# Patient Record
Sex: FEMALE | Race: BLACK OR AFRICAN AMERICAN | Employment: UNEMPLOYED | RURAL
[De-identification: names, ages, dates, MRNs, and addresses within clinical notes are randomized per-mention and may not be internally consistent; named-entity substitution may affect disease eponyms.]

---

## 2017-09-18 ENCOUNTER — OFFICE VISIT (OUTPATIENT)
Dept: FAMILY MEDICINE CLINIC | Age: 52
End: 2017-09-18

## 2017-09-18 VITALS
DIASTOLIC BLOOD PRESSURE: 78 MMHG | HEIGHT: 65 IN | WEIGHT: 165.8 LBS | HEART RATE: 71 BPM | TEMPERATURE: 99 F | OXYGEN SATURATION: 100 % | SYSTOLIC BLOOD PRESSURE: 140 MMHG | RESPIRATION RATE: 16 BRPM | BODY MASS INDEX: 27.63 KG/M2

## 2017-09-18 DIAGNOSIS — Z13.31 SCREENING FOR DEPRESSION: ICD-10-CM
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
## 2017-09-18 DIAGNOSIS — M54.2 NECK PAIN: ICD-10-CM
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
## 2017-09-19 ENCOUNTER — OFFICE VISIT (OUTPATIENT)
Dept: FAMILY MEDICINE CLINIC | Age: 52
End: 2017-09-19

## 2017-09-19 VITALS
HEIGHT: 65 IN | SYSTOLIC BLOOD PRESSURE: 122 MMHG | DIASTOLIC BLOOD PRESSURE: 60 MMHG | BODY MASS INDEX: 27.69 KG/M2 | TEMPERATURE: 98.8 F | WEIGHT: 166.2 LBS | HEART RATE: 88 BPM | OXYGEN SATURATION: 100 % | RESPIRATION RATE: 16 BRPM

## 2017-09-19 DIAGNOSIS — H61.23 HEARING LOSS DUE TO CERUMEN IMPACTION, BILATERAL: ICD-10-CM

## 2017-09-19 DIAGNOSIS — J01.00 ACUTE MAXILLARY SINUSITIS, RECURRENCE NOT SPECIFIED: Primary | ICD-10-CM

## 2017-09-19 RX ORDER — AZITHROMYCIN 250 MG/1
TABLET, FILM COATED ORAL
Qty: 6 TAB | Refills: 0 | Status: SHIPPED | OUTPATIENT
Start: 2017-09-19 | End: 2017-09-24

## 2017-09-19 NOTE — PROGRESS NOTES
HISTORY OF PRESENT ILLNESS  Denise Rendon is a 50 y.o. female. Chief Complaint   Patient presents with    Nasal Congestion       HPI  For a while now nasal congestion and coughing up phlegm for 3 w With green mucus from postnasal drip  Getting worse now  Temp 99 yesterday  Facial pain, with thick yellow mucus    No Strep contact    Would like wax removed from ears      Review of Systems   Constitutional: Positive for fever. HENT: Positive for congestion and sore throat (little). Negative for ear pain. Respiratory: Positive for cough and sputum production. Negative for shortness of breath and wheezing. Gastrointestinal: Positive for nausea. Negative for vomiting. Past Medical History:   Diagnosis Date    Allergic rhinitis     Bronchiectasis (HCC)     Bulging lumbar disc     Cavitating mass of lung 2009    L- benign    Cervical spondylarthritis     Hyperlipidemia     Hypomagnesemia     Hypothyroid     Lung mass     Panic      Current Outpatient Prescriptions   Medication Sig Dispense Refill    azithromycin (ZITHROMAX) 250 mg tablet Take 2 tablets today, then take 1 tablet daily 6 Tab 0    diphenhydrAMINE (BENADRYL) 25 mg capsule Take 25 mg by mouth every six (6) hours as needed.  acetaminophen (TYLENOL) 325 mg tablet Take  by mouth every four (4) hours as needed for Pain. Allergies   Allergen Reactions    Bactrim [Sulfamethoprim] Angioedema    Morphine Unknown (comments)    Pcn [Penicillins] Unknown (comments)    Percocet [Oxycodone-Acetaminophen] Unknown (comments)    Sulfa (Sulfonamide Antibiotics) Unknown (comments)     Visit Vitals    /60 (BP 1 Location: Right arm, BP Patient Position: Sitting)    Pulse 88    Temp 98.8 °F (37.1 °C) (Oral)    Resp 16    Ht 5' 5\" (1.651 m)    Wt 166 lb 3.2 oz (75.4 kg)    LMP 09/11/2017    SpO2 100%    BMI 27.66 kg/m2     Physical Exam   Constitutional: She is oriented to person, place, and time.  She appears well-developed and well-nourished. No distress. HENT:   Head: Normocephalic and atraumatic. Mouth/Throat: Oropharynx is clear and moist. No oropharyngeal exudate. Cerumen impaction bilat, white mucus in nose, facial tenderness over max sinuses   Eyes: Conjunctivae and EOM are normal. Pupils are equal, round, and reactive to light. Cardiovascular: Normal rate, regular rhythm and normal heart sounds. Pulmonary/Chest: Effort normal and breath sounds normal.   Lymphadenopathy:     She has no cervical adenopathy. Neurological: She is alert and oriented to person, place, and time. Skin: Skin is warm and dry. Psychiatric: She has a normal mood and affect. Nursing note and vitals reviewed. Cerumen removal bilat with curette and lavage successful, TM's intact    ASSESSMENT and PLAN    ICD-10-CM ICD-9-CM    1. Acute maxillary sinusitis, recurrence not specified J01.00 461.0 azithromycin (ZITHROMAX) 250 mg tablet   2.  Hearing loss due to cerumen impaction, bilateral H61.23 389.8 REMOVE IMPACTED EAR WAX     380.4

## 2017-09-19 NOTE — MR AVS SNAPSHOT
Visit Information Date & Time Provider Department Dept. Phone Encounter #  
 9/19/2017  8:20 AM Yudelka Senior  [facility] [phone] [id number] Follow-up Instructions Return if symptoms worsen or fail to improve. Follow-up and Disposition History Your Appointments 10/16/2017  9:20 AM  
ESTABLISHED PATIENT with Yudelka Senior MD  
[address] [facility] ([address]) Appt Note: 4 week f.up self pay 9/18/17 [facility] [phone] [phone]  
[phone]  
  
   
 [address] [address] Upcoming Health Maintenance Date Due DTaP/Tdap/Td series (1 - Tdap) 9/3/1988 PAP AKA CERVICAL CYTOLOGY 9/3/1988 INFLUENZA AGE 9 TO ADULT 8/1/2017 FOBT Q 1 YEAR AGE 50-75 9/3/2017 BREAST CANCER SCRN MAMMOGRAM 8/14/2019 Allergies as of 9/19/2017  Review Complete On: 9/19/2017 By: Sakina Harris LPN Severity Noted Reaction Type Reactions Bactrim [Sulfamethoprim]  12/11/2013    Angioedema Morphine  01/21/2014    Unknown (comments) Pcn [Penicillins]  08/20/2017    Unknown (comments) Percocet [Oxycodone-acetaminophen]  12/12/2013    Unknown (comments) Sulfa (Sulfonamide Antibiotics)  08/20/2017    Unknown (comments) Current Immunizations  Never Reviewed Name Date  
 TB Skin Test (PPD) 10/2/2009 Not reviewed this visit You Were Diagnosed With   
  
 Codes Comments Acute maxillary sinusitis, recurrence not specified    -  Primary ICD-10-CM: J01.00 ICD-9-CM: 461.0 Hearing loss due to cerumen impaction, bilateral     ICD-10-CM: H61.23 
ICD-9-CM: 389.8, 380.4 Vitals BP Pulse Temp Resp Height(growth percentile) Weight(growth percentile) 122/60 (BP 1 Location: Right arm, BP Patient Position: Sitting) 88 98.8 °F (37.1 °C) (Oral) 16 5' 5\" (1.651 m) 166 lb 3.2 oz (75.4 kg) LMP SpO2 BMI OB Status Smoking Status 09/11/2017 100% 27.66 kg/m2 Having regular periods Never Smoker BMI and BSA Data Body Mass Index Body Surface Area  
 27.66 kg/m 2 1.86 m 2 Preferred Pharmacy Pharmacy Name Phone [facility] [address] [place] [address], [address] [place] [address] Your Updated Medication List  
  
   
This list is accurate as of: 9/19/17  9:01 AM.  Always use your most recent med list.  
  
  
  
  
 azithromycin 250 mg tablet Commonly known as:  Marina Lowry Take 2 tablets today, then take 1 tablet daily BENADRYL 25 mg capsule Generic drug:  diphenhydrAMINE Take 25 mg by mouth every six (6) hours as needed. TYLENOL 325 mg tablet Generic drug:  acetaminophen Take  by mouth every four (4) hours as needed for Pain. Prescriptions Sent to Pharmacy Refills  
 azithromycin (ZITHROMAX) 250 mg tablet 0 Sig: Take 2 tablets today, then take 1 tablet daily Class: Normal  
 Pharmacy: [facility] [address] [place] [address] 3 & [facility] #: [phone] We Performed the Following REMOVE IMPACTED EAR WAX [61534 CPT(R)] Follow-up Instructions Return if symptoms worsen or fail to improve. Introducing [facility] & HEALTH SERVICES! Dear Alka Reach: 
Thank you for requesting a [employer] account. Our records indicate that you already have an active [employer] account. You can access your account anytime at [email or web address] Did you know that you can access your hospital and ER discharge instructions at any time in [employer]? You can also review all of your test results from your hospital stay or ER visit. Additional Information If you have questions, please visit the Frequently Asked Questions section of the [employer] website at [email or web address] Remember, [employer] is NOT to be used for urgent needs. For medical emergencies, dial 911. Now available from your iPhone and Android! Please provide this summary of care documentation to your next provider. Your primary care clinician is listed as NOT ON FILE. If you have any questions after today's visit, please call [phone].

## 2017-10-03 ENCOUNTER — OFFICE VISIT (OUTPATIENT)
Dept: FAMILY MEDICINE CLINIC | Age: 52
End: 2017-10-03

## 2017-10-03 VITALS
HEIGHT: 65 IN | OXYGEN SATURATION: 99 % | BODY MASS INDEX: 27.32 KG/M2 | HEART RATE: 80 BPM | SYSTOLIC BLOOD PRESSURE: 131 MMHG | WEIGHT: 164 LBS | RESPIRATION RATE: 18 BRPM | DIASTOLIC BLOOD PRESSURE: 74 MMHG | TEMPERATURE: 97 F

## 2017-10-03 DIAGNOSIS — K21.9 GASTROESOPHAGEAL REFLUX DISEASE, ESOPHAGITIS PRESENCE NOT SPECIFIED: Primary | ICD-10-CM

## 2017-10-03 RX ORDER — OMEPRAZOLE 20 MG/1
20 CAPSULE, DELAYED RELEASE ORAL DAILY
Qty: 30 CAP | Refills: 0 | Status: SHIPPED | OUTPATIENT
Start: 2017-10-03 | End: 2017-10-26 | Stop reason: SDUPTHER

## 2017-10-03 NOTE — PROGRESS NOTES
HISTORY OF PRESENT ILLNESS  Denise Hoyos is a 50 y.o. female. Chief Complaint   Patient presents with    Heartburn       HPI  Acid started again  Had it for years  Worse last week  Has not been able to eat much  Prev helped by changing foods  Was helping but not lately  Tried Gaviscon is helping some    Review of Systems   Constitutional: Positive for weight loss. Negative for fever. Respiratory: Negative for cough. Cardiovascular: Negative for chest pain. Gastrointestinal: Positive for abdominal pain (sharp sometimes), diarrhea (yesterday), heartburn and nausea. Negative for blood in stool, constipation, melena and vomiting. Genitourinary: Negative for dysuria. Past Medical History:   Diagnosis Date    Allergic rhinitis     Bronchiectasis (HCC)     Bulging lumbar disc     Cavitating mass of lung 2009    L- benign    Cervical spondylarthritis     Hyperlipidemia     Hypomagnesemia     Hypothyroid     Lung mass     Panic      Current Outpatient Prescriptions   Medication Sig Dispense Refill    omeprazole (PRILOSEC) 20 mg capsule Take 1 Cap by mouth daily. 30 Cap 0    diphenhydrAMINE (BENADRYL) 25 mg capsule Take 25 mg by mouth every six (6) hours as needed.  acetaminophen (TYLENOL) 325 mg tablet Take  by mouth every four (4) hours as needed for Pain. Allergies   Allergen Reactions    Bactrim [Sulfamethoprim] Angioedema    Morphine Unknown (comments)    Pcn [Penicillins] Unknown (comments)    Percocet [Oxycodone-Acetaminophen] Unknown (comments)    Sulfa (Sulfonamide Antibiotics) Unknown (comments)     Visit Vitals    /74 (BP 1 Location: Right arm, BP Patient Position: Sitting)    Pulse 80    Temp 97 °F (36.1 °C) (Temporal)    Resp 18    Ht 5' 5\" (1.651 m)    Wt 164 lb (74.4 kg)    LMP 09/11/2017    SpO2 99%    BMI 27.29 kg/m2     Physical Exam   Constitutional: She is oriented to person, place, and time. She appears well-developed and well-nourished.  No distress. HENT:   Head: Normocephalic and atraumatic. Mouth/Throat: Oropharynx is clear and moist. No oropharyngeal exudate. Eyes: Conjunctivae and EOM are normal.   Cardiovascular: Normal rate and regular rhythm. Pulmonary/Chest: Effort normal and breath sounds normal.   Abdominal: Soft. There is tenderness (epigastric). Lymphadenopathy:     She has no cervical adenopathy. Neurological: She is alert and oriented to person, place, and time. Skin: Skin is warm and dry. Psychiatric: She has a normal mood and affect. Nursing note and vitals reviewed. ASSESSMENT and PLAN    ICD-10-CM ICD-9-CM    1. Gastroesophageal reflux disease, esophagitis presence not specified K21.9 530.81 H.  PYLORI BREATH TEST      omeprazole (PRILOSEC) 20 mg capsule   recheck in 2 w

## 2017-10-03 NOTE — PATIENT INSTRUCTIONS

## 2017-10-26 ENCOUNTER — OFFICE VISIT (OUTPATIENT)
Dept: FAMILY MEDICINE CLINIC | Age: 52
End: 2017-10-26

## 2017-10-26 VITALS
SYSTOLIC BLOOD PRESSURE: 138 MMHG | BODY MASS INDEX: 26.33 KG/M2 | WEIGHT: 158.2 LBS | RESPIRATION RATE: 14 BRPM | DIASTOLIC BLOOD PRESSURE: 79 MMHG | TEMPERATURE: 96.7 F | HEART RATE: 87 BPM

## 2017-10-26 DIAGNOSIS — K21.9 GASTROESOPHAGEAL REFLUX DISEASE, ESOPHAGITIS PRESENCE NOT SPECIFIED: Primary | ICD-10-CM

## 2017-10-26 DIAGNOSIS — Q21.10 ASD (ATRIAL SEPTAL DEFECT): ICD-10-CM

## 2017-10-26 DIAGNOSIS — R63.4 WEIGHT LOSS: ICD-10-CM

## 2017-10-26 DIAGNOSIS — R10.13 EPIGASTRIC PAIN: ICD-10-CM

## 2017-10-26 DIAGNOSIS — Z12.11 COLON CANCER SCREENING: ICD-10-CM

## 2017-10-26 RX ORDER — OMEPRAZOLE 20 MG/1
CAPSULE, DELAYED RELEASE ORAL
Qty: 60 CAP | Refills: 1 | Status: SHIPPED | OUTPATIENT
Start: 2017-10-26 | End: 2018-01-02 | Stop reason: SDUPTHER

## 2017-10-26 NOTE — PROGRESS NOTES
HISTORY OF PRESENT ILLNESS  Denise Miller is a 50 y.o. female. Chief Complaint   Patient presents with    Medication Evaluation     2 wk f/u new med Omeprazole for acid reflux       HPI  Still has problems with reflux  Constantly burping   Weight loss  Started in Sept  Can't get food down  Feels like a bubble in esophagus and gurgling    Knotted muscles in epigastric area    Had Echo in the past at [facility] for ASD  Had aneurysm  No recheck since    Review of Systems   Constitutional: Positive for weight loss. Negative for diaphoresis. Respiratory: Positive for shortness of breath (with walking). Negative for cough. Cardiovascular: Positive for chest pain (spasm like). Negative for palpitations and leg swelling. Gastrointestinal: Positive for abdominal pain and heartburn. Negative for blood in stool, melena, nausea and vomiting. Neurological: Positive for dizziness (light headed this am). Past Medical History:   Diagnosis Date    Allergic rhinitis     Bronchiectasis (HCC)     Bulging lumbar disc     Cavitating mass of lung 2009    L- benign    Cervical spondylarthritis     Hyperlipidemia     Hypomagnesemia     Hypothyroid     Lung mass     Panic      Current Outpatient Prescriptions   Medication Sig Dispense Refill    omeprazole (PRILOSEC) 20 mg capsule Take one bid 60 Cap 1    diphenhydrAMINE (BENADRYL) 25 mg capsule Take 25 mg by mouth every six (6) hours as needed.  acetaminophen (TYLENOL) 325 mg tablet Take  by mouth every four (4) hours as needed for Pain.        Allergies   Allergen Reactions    Bactrim [Sulfamethoprim] Angioedema    Morphine Unknown (comments)    Pcn [Penicillins] Unknown (comments)    Percocet [Oxycodone-Acetaminophen] Unknown (comments)    Sulfa (Sulfonamide Antibiotics) Unknown (comments)     Visit Vitals    /79 (BP 1 Location: Left arm, BP Patient Position: Sitting)    Pulse 87    Temp 96.7 °F (35.9 °C) (Oral)    Resp 14    Wt 158 lb 3.2 oz (71.8 kg)    LMP 10/22/2017 (Approximate)    BMI 26.33 kg/m2     Physical Exam   Constitutional: She is oriented to person, place, and time. She appears well-developed and well-nourished. No distress. HENT:   Head: Normocephalic and atraumatic. Mouth/Throat: Oropharynx is clear and moist. No oropharyngeal exudate. Eyes: Conjunctivae and EOM are normal. Pupils are equal, round, and reactive to light. Cardiovascular: Normal rate and regular rhythm. Pulmonary/Chest: Effort normal and breath sounds normal.   Abdominal: Soft. She exhibits no distension. There is no tenderness. Lymphadenopathy:     She has no cervical adenopathy. Neurological: She is alert and oriented to person, place, and time. Skin: Skin is warm and dry. Psychiatric: She has a normal mood and affect. Nursing note and vitals reviewed. EKG ok    ASSESSMENT and PLAN    ICD-10-CM ICD-9-CM    1. Gastroesophageal reflux disease, esophagitis presence not specified K21.9 530.81 AMYLASE      LIPASE      omeprazole (PRILOSEC) 20 mg capsule   2. Weight loss Q40.1 311.13 METABOLIC PANEL, COMPREHENSIVE      CBC WITH AUTOMATED DIFF      TSH 3RD GENERATION      NJ HANDLG&/OR CONVEY OF SPEC FOR TR OFFICE TO LAB      N[place] COLLECTION VENOUS BLOOD,VENIPUNCTURE      REFERRAL TO GENERAL SURGERY   3. Epigastric pain R10.13 789.06 AMB POC EKG ROUTINE W/ 12 LEADS, INTER & REP   4. ASD (atrial septal defect) Q21.1 745. 5 2D ECHO COMPLETE ADULT (TTE) W OR WO CONTR   5.  Colon cancer screening Z12.11 V76.51 OCCULT BLOOD, IMMUNOASSAY (FIT)

## 2017-10-26 NOTE — PROGRESS NOTES
Chief Complaint   Patient presents with    Medication Evaluation     2 wk f/u new med Omeprazole for acid reflux     Health Maintenance Due   Topic Date Due    DTaP/Tdap/Td series (1 - Tdap) 09/03/1988    PAP AKA CERVICAL CYTOLOGY  09/03/1988    INFLUENZA AGE 9 TO ADULT  08/01/2017    FOBT Q 1 YEAR AGE 50-75  09/03/2017     Visit Vitals    /79 (BP 1 Location: Left arm, BP Patient Position: Sitting)    Pulse 87    Temp 96.7 °F (35.9 °C) (Oral)    Resp 14    Wt 158 lb 3.2 oz (71.8 kg)    LMP 10/22/2017 (Approximate)    BMI 26.33 kg/m2     Efrain Maher LPN

## 2017-10-27 ENCOUNTER — TELEPHONE (OUTPATIENT)
Dept: FAMILY MEDICINE CLINIC | Age: 52
End: 2017-10-27

## 2017-10-27 LAB
ALBUMIN SERPL-MCNC: 4.3 G/DL (ref 3.5–5.5)
ALBUMIN/GLOB SERPL: 1.3 {RATIO} (ref 1.2–2.2)
ALP SERPL-CCNC: 39 IU/L (ref 39–117)
ALT SERPL-CCNC: 6 IU/L (ref 0–32)
AMYLASE SERPL-CCNC: 90 U/L (ref 31–124)
AST SERPL-CCNC: 13 IU/L (ref 0–40)
BASOPHILS # BLD AUTO: 0 X10E3/UL (ref 0–0.2)
BASOPHILS NFR BLD AUTO: 0 %
BILIRUB SERPL-MCNC: 0.5 MG/DL (ref 0–1.2)
BUN SERPL-MCNC: 3 MG/DL (ref 6–24)
BUN/CREAT SERPL: 6 (ref 9–23)
CALCIUM SERPL-MCNC: 9.3 MG/DL (ref 8.7–10.2)
CHLORIDE SERPL-SCNC: 101 MMOL/L (ref 96–106)
CO2 SERPL-SCNC: 27 MMOL/L (ref 18–29)
CREAT SERPL-MCNC: 0.51 MG/DL (ref 0.57–1)
EOSINOPHIL # BLD AUTO: 0 X10E3/UL (ref 0–0.4)
EOSINOPHIL NFR BLD AUTO: 0 %
ERYTHROCYTE [DISTWIDTH] IN BLOOD BY AUTOMATED COUNT: 12.4 % (ref 12.3–15.4)
GFR SERPLBLD CREATININE-BSD FMLA CKD-EPI: 112 ML/MIN/1.73
GFR SERPLBLD CREATININE-BSD FMLA CKD-EPI: 130 ML/MIN/1.73
GLOBULIN SER CALC-MCNC: 3.4 G/DL (ref 1.5–4.5)
GLUCOSE SERPL-MCNC: 86 MG/DL (ref 65–99)
HCT VFR BLD AUTO: 35.9 % (ref 34–46.6)
HGB BLD-MCNC: 11.6 G/DL (ref 11.1–15.9)
IMM GRANULOCYTES # BLD: 0 X10E3/UL (ref 0–0.1)
IMM GRANULOCYTES NFR BLD: 0 %
LIPASE SERPL-CCNC: 20 U/L (ref 14–72)
LYMPHOCYTES # BLD AUTO: 1.1 X10E3/UL (ref 0.7–3.1)
LYMPHOCYTES NFR BLD AUTO: 23 %
MCH RBC QN AUTO: 31.4 PG (ref 26.6–33)
MCHC RBC AUTO-ENTMCNC: 32.3 G/DL (ref 31.5–35.7)
MCV RBC AUTO: 97 FL (ref 79–97)
MONOCYTES # BLD AUTO: 0.5 X10E3/UL (ref 0.1–0.9)
MONOCYTES NFR BLD AUTO: 10 %
NEUTROPHILS # BLD AUTO: 3.3 X10E3/UL (ref 1.4–7)
NEUTROPHILS NFR BLD AUTO: 67 %
PLATELET # BLD AUTO: 262 X10E3/UL (ref 150–379)
POTASSIUM SERPL-SCNC: 4.2 MMOL/L (ref 3.5–5.2)
PROT SERPL-MCNC: 7.7 G/DL (ref 6–8.5)
RBC # BLD AUTO: 3.7 X10E6/UL (ref 3.77–5.28)
SODIUM SERPL-SCNC: 142 MMOL/L (ref 134–144)
TSH SERPL DL<=0.005 MIU/L-ACNC: 2 UIU/ML (ref 0.45–4.5)
WBC # BLD AUTO: 4.9 X10E3/UL (ref 3.4–10.8)

## 2017-10-27 NOTE — PROGRESS NOTES
Call pt, the sugar, kidney and liver tests are normal  The Potassium is normal  The CBC is ok  The Pancreas tests are normal

## 2018-01-02 ENCOUNTER — OFFICE VISIT (OUTPATIENT)
Dept: FAMILY MEDICINE CLINIC | Age: 53
End: 2018-01-02

## 2018-01-02 VITALS
OXYGEN SATURATION: 100 % | SYSTOLIC BLOOD PRESSURE: 119 MMHG | TEMPERATURE: 97.8 F | HEART RATE: 90 BPM | RESPIRATION RATE: 20 BRPM | WEIGHT: 144 LBS | HEIGHT: 65 IN | DIASTOLIC BLOOD PRESSURE: 74 MMHG | BODY MASS INDEX: 23.99 KG/M2

## 2018-01-02 DIAGNOSIS — K21.9 GASTROESOPHAGEAL REFLUX DISEASE, ESOPHAGITIS PRESENCE NOT SPECIFIED: Primary | ICD-10-CM

## 2018-01-02 DIAGNOSIS — R63.4 WEIGHT LOSS: ICD-10-CM

## 2018-01-02 RX ORDER — OMEPRAZOLE 20 MG/1
CAPSULE, DELAYED RELEASE ORAL
Qty: 60 CAP | Refills: 3 | Status: SHIPPED | OUTPATIENT
Start: 2018-01-02 | End: 2018-04-10 | Stop reason: SDUPTHER

## 2018-01-02 NOTE — MR AVS SNAPSHOT
Visit Information Date & Time Provider Department Dept. Phone Encounter #  
 1/2/2018  8:40 AM Elva Toscano MD [address] [place] [address] [phone] [id number] Follow-up Instructions Return in about 3 months (around 4/2/2018). Follow-up and Disposition History Upcoming Health Maintenance Date Due DTaP/Tdap/Td series (1 - Tdap) 9/3/1988 Influenza Age 5 to Adult 8/1/2017 FOBT Q 1 YEAR AGE 50-75 9/3/2017 PAP AKA CERVICAL CYTOLOGY 10/13/2018 Allergies as of 1/2/2018  Review Complete On: 1/2/2018 By: Chris Hernandez LPN Severity Noted Reaction Type Reactions Bactrim [Sulfamethoprim]  12/11/2013    Angioedema Morphine  01/21/2014    Unknown (comments) Pcn [Penicillins]  08/20/2017    Unknown (comments) Percocet [Oxycodone-acetaminophen]  12/12/2013    Unknown (comments) Sulfa (Sulfonamide Antibiotics)  08/20/2017    Unknown (comments) Current Immunizations  Never Reviewed Name Date  
 TB Skin Test (PPD) 10/2/2009 Not reviewed this visit You Were Diagnosed With   
  
 Codes Comments Gastroesophageal reflux disease, esophagitis presence not specified    -  Primary ICD-10-CM: K21.9 ICD-9-CM: 530.81 Weight loss     ICD-10-CM: R63.4 ICD-9-CM: 783.21 Vitals BP Pulse Temp Resp Height(growth percentile) 119/74 (BP 1 Location: Right arm, BP Patient Position: Sitting) 90 97.8 °F (36.6 °C) (Temporal) 20 5' 5\" (1.651 m) Weight(growth percentile) SpO2 BMI OB Status Smoking Status 144 lb (65.3 kg) 100% 23.96 kg/m2 Having regular periods Never Smoker BMI and BSA Data Body Mass Index Body Surface Area  
 23.96 kg/m 2 1.73 m 2 Preferred Pharmacy Pharmacy Name Phone [facility] [address] [place], [address] Your Updated Medication List  
  
   
This list is accurate as of: 1/2/18  9:09 AM.  Always use your most recent med list.  
  
  
  
  
 BENADRYL 25 mg capsule Generic drug:  diphenhydrAMINE Take 25 mg by mouth every six (6) hours as needed. omeprazole 20 mg capsule Commonly known as:  PRILOSEC Take one bid TYLENOL 325 mg tablet Generic drug:  acetaminophen Take  by mouth every four (4) hours as needed for Pain. Prescriptions Sent to Pharmacy Refills  
 omeprazole (PRILOSEC) 20 mg capsule 3 Sig: Take one bid Class: Normal  
 Pharmacy: [facility] [address] [place] [address] [address] Ph #: [phone] Follow-up Instructions Return in about 3 months (around 4/2/2018). To-Do List   
 01/02/2018 Imaging:  XR CHEST PA LAT Introducing [facility]! Dear Heath Choudhary: 
Thank you for requesting a Timely account. Our records indicate that you already have an active Timely account. You can access your account anytime at [email or web address] Did you know that you can access your hospital and ER discharge instructions at any time in Timely? You can also review all of your test results from your hospital stay or ER visit. Additional Information If you have questions, please visit the Frequently Asked Questions section of the Timely website at [email or web address] Remember, Timely is NOT to be used for urgent needs. For medical emergencies, dial 911. Now available from your iPhone and Android! Please provide this summary of care documentation to your next provider. Your primary care clinician is listed as NOT ON FILE. If you have any questions after today's visit, please call [phone].

## 2018-01-02 NOTE — PROGRESS NOTES
HISTORY OF PRESENT ILLNESS  Denise Zarco is a 50 y.o. female. Chief Complaint   Patient presents with    Follow-up     progress on acid reflux, refill        HPI  Reflux better  No heart burn now  Prilosec helping  No SE  When stopping to take meds gets sy again  Made some diet changes  Has not had EGD yet  Will reschedule  Worse when stressed    Lost more weight  Has history of bronchiectasis  And lost weight with that in the past prev down to 113 lbs    Has FOBT card at home    Not smoking    Review of Systems   Constitutional: Positive for weight loss. Respiratory: Negative for cough, sputum production and shortness of breath. Cardiovascular: Negative for chest pain. Gastrointestinal: Positive for heartburn. Negative for abdominal pain, blood in stool, constipation, diarrhea, melena, nausea and vomiting. Genitourinary: Negative. Past Medical History:   Diagnosis Date    Allergic rhinitis     Bronchiectasis (HCC)     Bulging lumbar disc     Cavitating mass of lung 2009    L- benign    Cervical spondylarthritis     Hyperlipidemia     Hypomagnesemia     Hypothyroid     Lung mass     Panic      Current Outpatient Prescriptions   Medication Sig Dispense Refill    omeprazole (PRILOSEC) 20 mg capsule Take one bid 60 Cap 3    diphenhydrAMINE (BENADRYL) 25 mg capsule Take 25 mg by mouth every six (6) hours as needed.  acetaminophen (TYLENOL) 325 mg tablet Take  by mouth every four (4) hours as needed for Pain.        Allergies   Allergen Reactions    Bactrim [Sulfamethoprim] Angioedema    Morphine Unknown (comments)    Pcn [Penicillins] Unknown (comments)    Percocet [Oxycodone-Acetaminophen] Unknown (comments)    Sulfa (Sulfonamide Antibiotics) Unknown (comments)     Visit Vitals    /74 (BP 1 Location: Right arm, BP Patient Position: Sitting)    Pulse 90    Temp 97.8 °F (36.6 °C) (Temporal)    Resp 20    Ht 5' 5\" (1.651 m)    Wt 144 lb (65.3 kg)    SpO2 100%    BMI 23.96 kg/m2     Physical Exam   Constitutional: She is oriented to person, place, and time. She appears well-developed and well-nourished. No distress. HENT:   Head: Normocephalic and atraumatic. Mouth/Throat: Oropharynx is clear and moist. No oropharyngeal exudate. Eyes: Conjunctivae and EOM are normal. Pupils are equal, round, and reactive to light. Cardiovascular: Normal rate, regular rhythm and normal heart sounds. Pulmonary/Chest: Effort normal and breath sounds normal.   Abdominal: Soft. Bowel sounds are normal. She exhibits no distension. Musculoskeletal: She exhibits no edema. Lymphadenopathy:     She has no cervical adenopathy. Neurological: She is alert and oriented to person, place, and time. Skin: Skin is warm and dry. Psychiatric: She has a normal mood and affect. Nursing note and vitals reviewed. ASSESSMENT and PLAN    ICD-10-CM ICD-9-CM    1. Gastroesophageal reflux disease, esophagitis presence not specified K21.9 530.81 omeprazole (PRILOSEC) 20 mg capsule   2.  Weight loss R63.4 783.21 XR CHEST PA LAT   encouraged to set up for EGD and bring back the FOBT card  Try to keep weight stable  RTC if cont to loose weight

## 2018-02-01 ENCOUNTER — OFFICE VISIT (OUTPATIENT)
Dept: FAMILY MEDICINE CLINIC | Age: 53
End: 2018-02-01

## 2018-02-01 VITALS
SYSTOLIC BLOOD PRESSURE: 118 MMHG | DIASTOLIC BLOOD PRESSURE: 66 MMHG | OXYGEN SATURATION: 99 % | RESPIRATION RATE: 16 BRPM | HEART RATE: 87 BPM | TEMPERATURE: 96.7 F | BODY MASS INDEX: 23.96 KG/M2 | WEIGHT: 144 LBS

## 2018-02-01 DIAGNOSIS — M54.10 RADICULOPATHY OF ARM: Primary | ICD-10-CM
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
## 2018-03-29 ENCOUNTER — TELEPHONE (OUTPATIENT)
Dept: FAMILY MEDICINE CLINIC | Age: 53
End: 2018-03-29

## 2018-03-29 DIAGNOSIS — M50.90 CERVICAL DISC DISEASE: Primary | ICD-10-CM

## 2018-04-10 ENCOUNTER — OFFICE VISIT (OUTPATIENT)
Dept: FAMILY MEDICINE CLINIC | Age: 53
End: 2018-04-10

## 2018-04-10 VITALS
RESPIRATION RATE: 18 BRPM | HEART RATE: 73 BPM | DIASTOLIC BLOOD PRESSURE: 46 MMHG | WEIGHT: 148 LBS | TEMPERATURE: 96.1 F | SYSTOLIC BLOOD PRESSURE: 108 MMHG | HEIGHT: 65 IN | BODY MASS INDEX: 24.66 KG/M2 | OXYGEN SATURATION: 96 %

## 2018-04-10 DIAGNOSIS — G95.20 SPINAL CORD COMPRESSION (HCC): ICD-10-CM

## 2018-04-10 DIAGNOSIS — Z12.11 COLON CANCER SCREENING: ICD-10-CM

## 2018-04-10 DIAGNOSIS — K21.9 GASTROESOPHAGEAL REFLUX DISEASE, ESOPHAGITIS PRESENCE NOT SPECIFIED: Primary | ICD-10-CM

## 2018-04-10 RX ORDER — OMEPRAZOLE 20 MG/1
CAPSULE, DELAYED RELEASE ORAL
Qty: 90 CAP | Refills: 1 | Status: SHIPPED | OUTPATIENT
Start: 2018-04-10 | End: 2020-01-30

## 2018-04-10 NOTE — LETTER
5/29/2018 2:42 PM 
 
Ms. Denise Lane 
[address] [address] [address] [address] Dear Ni Sanchez: Please find your most recent results below. Resulted Orders OCCULT BLOOD, IMMUNOASSAY (FIT) Result Value Ref Range Occult blood fecal, by IA Negative Negative Narrative Performed at:  [address]  [id number] : Criselda Pang MD, Phone:  [phone] Stool testing was negative for blood Please call me if you have any questions: [phone] Sincerely, 
 
 
Merle Powell MD

## 2018-04-10 NOTE — LETTER
10/8/2018 Denise Lane  
[address] [address] [address] [address] At a recent visit,  you were given a prescription to have H. Pylori stool test done. As of this date our office has not yet received the results of the requested test/procedure. As your provider, I still want you to have this testing so please schedule. If you have had the test/procedure performed please contact our office so we can get the information and have your provider review the results. Results of all testing (normal or abnormal) will be reported to you by this office once received. Sincerely, Johana Saver Dr. Ana [address] [facility] [address] 
[phone]

## 2018-04-10 NOTE — PROGRESS NOTES
HISTORY OF PRESENT ILLNESS  Denise Fox is a 50 y.o. female. Chief Complaint   Patient presents with    Medication Evaluation     Prilosec       HPI  Saw Neurosurgeon yesterday re cervical disc pressing on cord  Getting ready for surgery  Left sided weakness    Taking Prilosec one a day now  Able to eat more  Still now and then had stomach ache  On bland diet and water      ROS  Past Medical History:   Diagnosis Date    Allergic rhinitis     Bronchiectasis ([address]     Bulging lumbar disc     Cavitating mass of lung 2009    L- benign    Cervical spondylarthritis     Hyperlipidemia     Hypomagnesemia     Hypothyroid     Lung mass     Panic      Current Outpatient Prescriptions   Medication Sig Dispense Refill    omeprazole (PRILOSEC) 20 mg capsule Take one bid 90 Cap 1    diphenhydrAMINE (BENADRYL) 25 mg capsule Take 25 mg by mouth every six (6) hours as needed.  acetaminophen (TYLENOL) 325 mg tablet Take  by mouth every four (4) hours as needed for Pain. Allergies   Allergen Reactions    Bactrim [Sulfamethoprim] Angioedema    Morphine Unknown (comments)    Pcn [Penicillins] Unknown (comments)    Percocet [Oxycodone-Acetaminophen] Unknown (comments)    Sulfa (Sulfonamide Antibiotics) Unknown (comments)     Visit Vitals    /46 (BP 1 Location: Right arm, BP Patient Position: Sitting)    Pulse 73    Temp 96.1 °F (35.6 °C) (Oral)    Resp 18    Ht 5' 5\" (1.651 m)    Wt 148 lb (67.1 kg)    LMP 04/03/2018 (Approximate)    SpO2 96%    BMI 24.63 kg/m2     Physical Exam   Constitutional: She is oriented to person, place, and time. She appears well-developed and well-nourished. No distress. HENT:   Head: Normocephalic and atraumatic. Eyes: Conjunctivae and EOM are normal. Pupils are equal, round, and reactive to light. Cardiovascular: Normal rate and regular rhythm. Pulmonary/Chest: Effort normal and breath sounds normal.   Abdominal: She exhibits no distension.  There is no tenderness. Musculoskeletal: She exhibits no edema. Lymphadenopathy:     She has no cervical adenopathy. Neurological: She is alert and oriented to person, place, and time. Skin: Skin is warm and dry. Psychiatric: She has a normal mood and affect. Nursing note and vitals reviewed. ASSESSMENT and PLAN    ICD-10-CM ICD-9-CM    1. Gastroesophageal reflux disease, esophagitis presence not specified K21.9 530.81 omeprazole (PRILOSEC) 20 mg capsule      H PYL[facility]I [facility], STOOL   2.  Colon cancer screening Z12.11 V76.51 OCCULT BLOOD, IMMUNOASSAY (FIT)   3. Spinal cord compression (HCC) G95.20 336.9    F/U with surgeon asap  Rx for walker with wheels given  And list of care at home agencies

## 2018-04-10 NOTE — MR AVS SNAPSHOT
[facility] 6 
[phone] Patient: Diana Trinh MRN: [id number] DBO:[id number] Visit Information Date & Time Provider Department Dept. Phone Encounter #  
 4/10/2018  1:40 PM Alvin Simpson MD [address] [place] [address] [phone] [id number] Upcoming Health Maintenance Date Due DTaP/Tdap/Td series (1 - Tdap) 9/3/1988 FOBT Q 1 YEAR AGE 50-75 9/3/2017 PAP AKA CERVICAL CYTOLOGY 10/13/2018 BREAST CANCER SCRN MAMMOGRAM 8/14/2019 Allergies as of 4/10/2018  Review Complete On: 4/10/2018 By: Perla Crowell LPN Severity Noted Reaction Type Reactions Bactrim [Sulfamethoprim]  12/11/2013    Angioedema Morphine  01/21/2014    Unknown (comments) Pcn [Penicillins]  08/20/2017    Unknown (comments) Percocet [Oxycodone-acetaminophen]  12/12/2013    Unknown (comments) Sulfa (Sulfonamide Antibiotics)  08/20/2017    Unknown (comments) Current Immunizations  Never Reviewed Name Date  
 TB Skin Test (PPD) 10/2/2009 Not reviewed this visit You Were Diagnosed With   
  
 Codes Comments Gastroesophageal reflux disease, esophagitis presence not specified    -  Primary ICD-10-CM: K21.9 ICD-9-CM: 530.81 Colon cancer screening     ICD-10-CM: Z12.11 ICD-9-CM: V76.51 Spinal cord compression (HCC)     ICD-10-CM: G95.20 ICD-9-CM: 336.9 Vitals BP Pulse Temp Resp Height(growth percentile) Weight(growth percentile) 108/46 (BP 1 Location: Right arm, BP Patient Position: Sitting) 73 96.1 °F (35.6 °C) (Oral) 18 5' 5\" (1.651 m) 148 lb (67.1 kg) LMP SpO2 BMI OB Status Smoking Status 04/03/2018 (Approximate) 96% 24.63 kg/m2 Having regular periods Never Smoker Vitals History BMI and BSA Data Body Mass Index Body Surface Area  
 24.63 kg/m 2 1.75 m 2 Preferred Pharmacy Pharmacy Name Phone [facility] [address] [place], [address] [facility] [address] Your Updated Medication List  
  
   
This list is accurate as of 4/10/18  2:22 PM.  Always use your most recent med list.  
  
  
  
  
 BENADRYL 25 mg capsule Generic drug:  diphenhydrAMINE Take 25 mg by mouth every six (6) hours as needed. omeprazole 20 mg capsule Commonly known as:  PRILOSEC Take one bid TYLENOL 325 mg tablet Generic drug:  acetaminophen Take  by mouth every four (4) hours as needed for Pain. Prescriptions Sent to Pharmacy Refills  
 omeprazole (PRILOSEC) 20 mg capsule 1 Sig: Take one bid Class: Normal  
 [facility] [facility] [address] [place] [address] [address]  #: [phone] To-Do List   
 04/10/2018 Lab:  [place], STOOL   
  
 05/10/2018 Lab:  OCCULT BLOOD, IMMUNOASSAY (FIT) Introducing [facility]! Dear Jean Carlos Peck: 
Thank you for requesting a [employer] account. Our records indicate that you already have an active Knowlent account. You can access your account anytime at [email or web address] Did you know that you can access your hospital and ER discharge instructions at any time in [employer]? You can also review all of your test results from your hospital stay or ER visit. Additional Information If you have questions, please visit the Frequently Asked Questions section of the Knowlent website at [email or web address] Remember, Knowlent is NOT to be used for urgent needs. For medical emergencies, dial 911. Now available from your iPhone and Android! Please provide this summary of care documentation to your next provider. Your primary care clinician is listed as NOT ON FILE. If you have any questions after today's visit, please call [phone].

## 2018-04-24 DIAGNOSIS — Z90.2 HISTORY OF LOBECTOMY OF LUNG: Primary | ICD-10-CM

## 2018-05-15 ENCOUNTER — TELEPHONE (OUTPATIENT)
Dept: FAMILY MEDICINE CLINIC | Age: 53
End: 2018-05-15

## 2018-05-28 LAB — HEMOCCULT STL QL IA: NEGATIVE

## 2018-06-07 ENCOUNTER — OFFICE VISIT (OUTPATIENT)
Dept: FAMILY MEDICINE CLINIC | Age: 53
End: 2018-06-07

## 2018-06-07 VITALS
TEMPERATURE: 98.3 F | OXYGEN SATURATION: 100 % | HEIGHT: 65 IN | WEIGHT: 155 LBS | HEART RATE: 73 BPM | SYSTOLIC BLOOD PRESSURE: 124 MMHG | DIASTOLIC BLOOD PRESSURE: 71 MMHG | RESPIRATION RATE: 18 BRPM | BODY MASS INDEX: 25.83 KG/M2

## 2018-06-07 DIAGNOSIS — L08.9 SKIN PUSTULE: Primary | ICD-10-CM

## 2018-06-07 NOTE — PATIENT INSTRUCTIONS
Follow-up if the spot enlarges or becomes more inflamed  Follow-up if you develop any rash in the area

## 2018-06-07 NOTE — PROGRESS NOTES
Ena Gilman is a 50 y.o. female who presents to the office today with the following:  Chief Complaint   Patient presents with    Tick Removal     R neck       Allergies   Allergen Reactions    Bactrim [Sulfamethoprim] Angioedema    Morphine Unknown (comments)    Pcn [Penicillins] Unknown (comments)    Percocet [Oxycodone-Acetaminophen] Unknown (comments)    Sulfa (Sulfonamide Antibiotics) Unknown (comments)       Current Outpatient Prescriptions   Medication Sig    omeprazole (PRILOSEC) 20 mg capsule Take one bid    diphenhydrAMINE (BENADRYL) 25 mg capsule Take 25 mg by mouth every six (6) hours as needed.  acetaminophen (TYLENOL) 325 mg tablet Take  by mouth every four (4) hours as needed for Pain. No current facility-administered medications for this visit. Past Medical History:   Diagnosis Date    Allergic rhinitis     Bronchiectasis (HCC)     Bulging lumbar disc     Cavitating mass of lung 2009    L- benign    Cervical spondylarthritis     Hyperlipidemia     Hypomagnesemia     Hypothyroid     Lung mass     Panic        Past Surgical History:   Procedure Laterality Date    HX HEART CATHETERIZATION  2012    HX ORTHOPAEDIC      HX THORACOTOMY      HX THYROIDECTOMY Left        History   Smoking Status    Never Smoker   Smokeless Tobacco    Never Used       Family History   Problem Relation Age of Onset    Hypertension Mother     Other Father      [place] disease    Hypertension Other     Stroke Other     Hypertension Maternal Grandmother          History of Present Illness:  Patient here for evaluation possible tick bite    Patient states last night she felt something bite her on the side of the neck. This morning it was sore she picked at it and thought she got something off. She came in to have a tick removed.   She has no other complaints     She admits her vision is poor and so it is hard for her to tell what she has on her neck      Review of Systems:    Review of systems negative except as noted above      Physical Exam:  Visit Vitals    /71 (BP 1 Location: Right arm, BP Patient Position: Sitting)    Pulse 73    Temp 98.3 °F (36.8 °C) (Temporal)    Resp 18    Ht 5' 5\" (1.651 m)    Wt 155 lb (70.3 kg)    SpO2 100%    BMI 25.79 kg/m2     Vitals:    06/07/18 0739   BP: 124/71   BP 1 Location: Right arm   BP Patient Position: Sitting   Pulse: 73   Resp: 18   Temp: 98.3 °F (36.8 °C)   TempSrc: Temporal   SpO2: 100%   Weight: 155 lb (70.3 kg)   Height: 5' 5\" (1.651 m)       Patient was in no acute distress vital signs as above  Right lateral neck patient had a minute less than 1 mm pustule. There is no evidence of a tick. No rash    Assessment/Plan:  1. Skin pustule  Patient with probable insect bite and small pustule today. No evidence of a tick. No treatment necessary at this point. She will follow-up if the area becomes more inflamed. She will follow-up if she develops any rash          Continue current therapy plan except for indicated above. Verbal and written instructions (see AVS) provided.  Patient expresses understanding of diagnosis and treatment plan. Follow-up Disposition:  Return if symptoms worsen or fail to improve. Wanda Arnold.  Jad Carney MD

## 2018-06-07 NOTE — PROGRESS NOTES
1. Have you been to the ER, urgent care clinic since your last visit? Hospitalized since your last visit? No    2. Have you seen or consulted any other health care providers outside of the [address] [place] [address] since your last visit? Include any pap smears or colon screening.  Yes Where: Dr Jade Sumner, [facility] [address] [place] [address]

## 2021-08-23 ENCOUNTER — OFFICE VISIT (OUTPATIENT)
Dept: FAMILY MEDICINE CLINIC | Age: 56
End: 2021-08-23
Payer: MEDICAID

## 2021-08-23 VITALS
BODY MASS INDEX: 23.32 KG/M2 | HEIGHT: 65 IN | SYSTOLIC BLOOD PRESSURE: 127 MMHG | WEIGHT: 140 LBS | RESPIRATION RATE: 12 BRPM | OXYGEN SATURATION: 98 % | HEART RATE: 94 BPM | TEMPERATURE: 98 F | DIASTOLIC BLOOD PRESSURE: 75 MMHG

## 2021-08-23 DIAGNOSIS — M50.90 CERVICAL DISC DISEASE: ICD-10-CM

## 2021-08-23 DIAGNOSIS — M54.2 NECK PAIN: Primary | ICD-10-CM

## 2021-08-23 PROCEDURE — 99213 OFFICE O/P EST LOW 20 MIN: CPT | Performed by: FAMILY MEDICINE

## 2021-08-23 RX ORDER — BACLOFEN 10 MG/1
10 TABLET ORAL 3 TIMES DAILY
Qty: 90 TABLET | Refills: 0 | Status: SHIPPED | OUTPATIENT
Start: 2021-08-23

## 2021-08-23 RX ORDER — DICLOFENAC SODIUM 75 MG/1
75 TABLET, DELAYED RELEASE ORAL 2 TIMES DAILY
Qty: 60 TABLET | Refills: 1 | Status: SHIPPED | OUTPATIENT
Start: 2021-08-23

## 2021-08-23 NOTE — PROGRESS NOTES
1. Have you been to the ER, urgent care clinic since your last visit? Hospitalized since your last visit? No    2. Have you seen or consulted any other health care providers outside of the [address] [place] [address] since your last visit? Include any pap smears or colon screening.  No

## 2021-08-23 NOTE — PROGRESS NOTES
Garth Michelle is a 50 y.o. female who presents to the office today with the following:  Chief Complaint   Patient presents with    Neck Pain     x several mos       HPI  Pain in neck  Has seen back specialist but nothing can be done  Pain off the scale  Sharp aching, radiating across the shoulders  Hard to sleep at night  Muscle relaxer did not help much but can't remember taking one  Numbness in left side of fingers has not changed  Strength still ok    Taking Tylenol ES up to eight  Tried warm compresses    ROS  See HPI. Past Medical History:   Diagnosis Date    Allergic rhinitis     Bronchiectasis ([address]     Bulging lumbar disc     Cavitating mass of lung 2009    L- benign    Cervical disc disorder at C5-C6 level with radiculopathy     Cervical spondylarthritis     Hyperlipidemia     Hypomagnesemia     Hypothyroid     Lung mass     Panic        Past Surgical History:   Procedure Laterality Date    HX HEART CATHETERIZATION  2012    HX ORTHOPAEDIC      HX THORACOTOMY      HX THYROIDECTOMY Left        Allergies   Allergen Reactions    Bactrim [Sulfamethoprim] Angioedema    Morphine Unknown (comments)    Pcn [Penicillins] Unknown (comments)    Percocet [Oxycodone-Acetaminophen] Unknown (comments)    Sulfa (Sulfonamide Antibiotics) Unknown (comments)       Current Outpatient Medications   Medication Sig    diclofenac EC (VOLTAREN) 75 mg EC tablet Take 1 Tablet by mouth two (2) times a day.  baclofen (LIORESAL) 10 mg tablet Take 1 Tablet by mouth three (3) times daily.  diphenhydrAMINE (BENADRYL) 25 mg capsule Take 25 mg by mouth every six (6) hours as needed.  acetaminophen (TYLENOL) 325 mg tablet Take  by mouth every four (4) hours as needed for Pain. No current facility-administered medications for this visit.        Social History     Socioeconomic History    Marital status: SINGLE     Spouse name: Not on file    Number of children: Not on file    Years of education: Not on file    Highest education level: Not on file   Tobacco Use    Smoking status: Never Smoker    Smokeless tobacco: Never Used   Substance and Sexual Activity    Alcohol use: No    Drug use: No    Sexual activity: Never     Social Determinants of Health     Financial Resource Strain:     Difficulty of Paying Living Expenses:    Food Insecurity:     Worried About Running Out of Food in the Last Year:     [address] in the Last Year:    Transportation Needs:     Lack of Transportation (Medical):  Lack of Transportation (Non-Medical):    Physical Activity:     Days of Exercise per Week:     Minutes of Exercise per Session:    Stress:     Feeling of Stress :    Social Connections:     Frequency of Communication with Friends and Family:     Frequency of Social Gatherings with Friends and Family:     Attends [employer] Services:     Active Member of Clubs or Organizations:     Attends Club or Organization Meetings:     Marital Status:        Family History   Problem Relation Age of Onset    Hypertension Mother     Other Father         [place] disease    Hypertension Other     Stroke Other     Hypertension Maternal Grandmother          Physical Exam:  Visit Vitals  /75 (BP 1 Location: Left upper arm)   Pulse 94   Temp 98 °F (36.7 °C)   Resp 12   Ht 5' 5\" (1.651 m)   Wt 140 lb (63.5 kg)   SpO2 98%   BMI 23.30 kg/m²     Physical Exam  Vitals and nursing note reviewed. HENT:      Head: Normocephalic and atraumatic. Eyes:      Extraocular Movements: Extraocular movements intact. Conjunctiva/sclera: Conjunctivae normal.   Cardiovascular:      Rate and Rhythm: Normal rate and regular rhythm. Heart sounds: Normal heart sounds. Pulmonary:      Effort: Pulmonary effort is normal.      Breath sounds: Normal breath sounds. Musculoskeletal:      Cervical back: Rigidity (mild ) and tenderness present. Lymphadenopathy:      Cervical: No cervical adenopathy.    Neurological: Mental Status: She is alert and oriented to person, place, and time. Psychiatric:         Mood and Affect: Mood normal.         Behavior: Behavior normal.         Assessment/Plan:    ICD-10-CM ICD-9-CM    1. Neck pain  M54.2 723.1 diclofenac EC (VOLTAREN) 75 mg EC tablet      baclofen (LIORESAL) 10 mg tablet   2.  Cervical disc disease  M50.90 722.91            Veronica Anderson MD

## 2021-08-23 NOTE — ACP (ADVANCE CARE PLANNING)
Non-Provider Advance Care Planning (ACP) Note    Date of ACP Conversation: 8/23/2021  Persons included in Conversation: patient  Length of ACP Conversation in minutes: <16 minutes (Non-Billable)    Conversation requested by:   Provider    Authorized Decision Maker (if patient is incapable of making informed decisions):    This person is:  Next of Kin by law (only applies in absence of a Healthcare Power of  or Legal Guardian)        General ACP for ALL Patients with Decision Making Capacity:    Advance Directive Conversation with Patients who have not yet planned:  Importance of advance care planning, including choosing a healthcare agent to communicate patient's healthcare decisions if patient lost the ability to make decisions, such as after a sudden illness or accident    Review of Existing Advance Directive: (Select questions covered)  na    Interventions Provided:  Provided ACP educational materials:  Conversation Starter Kit  Advance Directive Form

## 2022-03-19 PROBLEM — Q21.10 ASD (ATRIAL SEPTAL DEFECT): Status: ACTIVE | Noted: 2017-10-26

## 2022-03-19 PROBLEM — G95.20 SPINAL CORD COMPRESSION (HCC): Status: ACTIVE | Noted: 2018-04-10

## 2022-06-20 ENCOUNTER — TELEPHONE (OUTPATIENT)
Dept: FAMILY MEDICINE CLINIC | Age: 57
End: 2022-06-20

## 2022-06-20 DIAGNOSIS — R92.8 ABNORMAL MAMMOGRAM: Primary | ICD-10-CM

## 2022-06-20 DIAGNOSIS — N63.21 MASS OF UPPER OUTER QUADRANT OF LEFT BREAST: ICD-10-CM

## 2022-06-20 NOTE — TELEPHONE ENCOUNTER
Call from [facility] from [facility]. Patient was in for routing mammogram and several suspicious areas were noted.  They are needing an order for a breast biopsy left breast. ICD code N63.21

## 2022-06-21 NOTE — TELEPHONE ENCOUNTER
Spoke with Kaleb Anthony, [facility] re US order in [facility]

## 2022-06-30 DIAGNOSIS — N63.21 MASS OF UPPER OUTER QUADRANT OF LEFT BREAST: ICD-10-CM

## 2022-06-30 DIAGNOSIS — R92.8 ABNORMAL MAMMOGRAM: ICD-10-CM

## 2022-07-07 ENCOUNTER — TELEPHONE (OUTPATIENT)
Dept: FAMILY MEDICINE CLINIC | Age: 57
End: 2022-07-07

## 2022-07-07 NOTE — TELEPHONE ENCOUNTER
I spoke with pt  A breast MRI has been set up for her by a nurse coordinator d/t positive breast biopsy

## 2023-05-17 RX ORDER — BACLOFEN 10 MG/1
10 TABLET ORAL 3 TIMES DAILY
COMMUNITY
Start: 2021-08-23

## 2023-05-17 RX ORDER — ACETAMINOPHEN 325 MG/1
TABLET ORAL EVERY 4 HOURS PRN
COMMUNITY

## 2023-05-17 RX ORDER — DICLOFENAC SODIUM 75 MG/1
75 TABLET, DELAYED RELEASE ORAL 2 TIMES DAILY
COMMUNITY
Start: 2021-08-23

## 2023-05-17 RX ORDER — DIPHENHYDRAMINE HCL 25 MG
25 CAPSULE ORAL EVERY 6 HOURS PRN
COMMUNITY